# Patient Record
Sex: FEMALE | Race: WHITE | URBAN - METROPOLITAN AREA
[De-identification: names, ages, dates, MRNs, and addresses within clinical notes are randomized per-mention and may not be internally consistent; named-entity substitution may affect disease eponyms.]

---

## 2017-05-22 ENCOUNTER — THERAPY VISIT (OUTPATIENT)
Dept: PHYSICAL THERAPY | Facility: CLINIC | Age: 72
End: 2017-05-22
Payer: COMMERCIAL

## 2017-05-22 DIAGNOSIS — S76.311D RIGHT PROXIMAL HAMSTRING TENDON RUPTURE, SUBSEQUENT ENCOUNTER: Primary | ICD-10-CM

## 2017-05-22 PROCEDURE — 97161 PT EVAL LOW COMPLEX 20 MIN: CPT | Mod: GP | Performed by: PHYSICAL THERAPIST

## 2017-05-22 PROCEDURE — 97110 THERAPEUTIC EXERCISES: CPT | Mod: GP | Performed by: PHYSICAL THERAPIST

## 2017-05-22 NOTE — MR AVS SNAPSHOT
After Visit Summary   5/22/2017    Marilynn De La Rosa    MRN: [id number]           Patient Information     Date Of Birth          1945        Visit Information        Provider Department      5/22/2017 5:20 PM Teo Gayle, PT [facility] Athletic [facility] Physical Therapy        Today's Diagnoses     Right proximal hamstring tendon rupture, subsequent encounter    -  1       Follow-ups after your visit        Your next 10 appointments already scheduled     Jun 01, 2017 11:20 AM CDT   LUBNA Extremity with Teo Gayle PT   [facility] ([facility]  )    [address] [place] [id number]   [phone]            Jun 06, 2017 12:00 PM CDT   LUBNA Extremity with Teo Gayle PT   [facility] ([facility]  )    [address] [place] [address]   [phone]            Jun 16, 2017  2:00 PM CDT   LUBNA Extremity with Teo Gayle PT   [facility] ([facility]  )    [address] [place] [address]   [phone]            Jun 20, 2017 10:10 AM CDT   LUBNA Extremity with Teo Gayle PT   [facility] ([facility]  )    [address] [place] [address]   [phone]              Who to contact     If you have questions or need follow up information about today's clinic visit or your schedule please contact [facility] ATHLETIC [facility] PHYSICAL THERAPY directly at [phone].  Normal or non-critical lab and imaging results will be communicated to you by MyChart, letter or phone within 4 business days after the clinic has received the results. If you do not hear from us within 7 days, please contact the clinic through MyChart or phone. If you have a critical or abnormal lab result, we  "will notify you by phone as soon as possible.  Submit refill requests through [employer] or call your pharmacy and they will forward the refill request to us. Please allow 3 business days for your refill to be completed.          Additional Information About Your Visit        [employer] Information     [employer] lets you send messages to your doctor, view your test results, renew your prescriptions, schedule appointments and more. To sign up, go to [email or web address] . Click on \"Log in\" on the left side of the screen, which will take you to the Welcome page. Then click on \"Sign up Now\" on the right side of the page.     You will be asked to enter the access code listed below, as well as some personal information. Please follow the directions to create your username and password.     Your access code is: [email or web address]  Expires: 2017  5:14 AM     Your access code will  in 90 days. If you need help or a new code, please call your [place] clinic or [phone].        Care EveryWhere ID     This is your Care EveryWhere ID. This could be used by other organizations to access your [place] medical records  [id number]         Blood Pressure from Last 3 Encounters:   No data found for BP    Weight from Last 3 Encounters:   No data found for Wt              We Performed the Following     LUBNA Inital Eval Report     PT Eval, Low Complexity (50089)     Therapeutic Exercises        Primary Care Provider    None Specified       No primary provider on file.        Thank you!     Thank you for choosing [facility] PH[facility]ICAL THERAP[facility]  for your care. Our goal is always to provide you with excellent care. Hearing back from our patients is one way we can continue to improve our services. Please take a few minutes to complete the written survey that you may receive in the mail after your visit with us. Thank you!             Your Updated Medication List - Protect others around you: Learn how " to safely use, store and throw away your medicines at www.disposemymeds.org.      Notice  As of 5/22/2017 11:59 PM    You have not been prescribed any medications.

## 2017-05-22 NOTE — LETTER
[facility] ATHLETIC [facility] PHYSICAL [facility]  [address] [address]  [phone]    May 31, 2017    Re: Marilynn De La Rosa   :   1945  MRN:  [id number]   REFERRING PHYSICIAN:   Car Davis    [facility] ATHLETIC [facility] PHYSICAL [facility]    Date of Initial Evaluation:  17  Visits: 1 Rxs Used: 1  Reason for Referral:  Right proximal hamstring tendon rupture, subsequent encounter    EVALUATION SUMMARY    Subjective:  Patient is a 72 year old female presenting with rehab right hip hpi.   Marilynn De La Rosa is a 72 year old female with a right hip condition.  Condition occurred with:  A fall/slip.  Condition occurred: at home.  This is a new condition  17.  Patient injured her R hamstring at home when she slipped on a dog treat on her hardwood floor.  Her R leg went out forward and she landed on her L knee.  She felt a pop followed by severe pain over her R buttock and posterior thigh.  She was diagnosed with proximal R hamstring tear on MRI..    Patient reports pain:  Posterior.  Radiates to:  Knee.  Pain is described as other and aching (sore) and is intermittent and reported as 4/10.  Associated symptoms:  Loss of strength.   Symptoms are exacerbated by sitting, walking, descending stairs, ascending stairs and bending/squatting and relieved by NSAID's, analgesics and other (lying on stomach, wrapping R thigh with elastic wrap).  Since onset symptoms are gradually improving.  Special tests:  MRI.      General health as reported by patient is good.  Pertinent medical history includes:  High blood pressure, implanted device and sleep disorder/apnea.  Medical allergies: no.  Other surgeries include:  Orthopedic surgery (R TKA 16).  Current medications:  High blood pressure medication and pain medication.  Current occupation is Retired .      Barriers include:  Stairs.  Red flags:  Pain at rest/night (consistent with  current hamstring injury).    Objective:  Gait:    Gait Type:  Antalgic   Assistive Devices:  None  Deviations:  Ankle:  Push off decr RGeneral Deviations:  Stride length decr and mirian decr  Flexibility/Screens:   Lower Extremity:  Decreased right lower extremity flexibility:  Hamstrings      Re: Marilynn De La Rosa   :   1945    Hip Evaluation  Hip PROM:    Flexion: Left: WNL -   Right: WNL -  Internal Rotation: Left: WNL -    Right: WNL -  External Rotation: Left: WNL -    Right: WNL -  Hip Palpation:    Right hip tenderness present at:  Ischial Tuberosity  Knee Evaluation:  ROM:    AROM  Extension:  Left: WNL    Right:  WNL -  Flexion: Left: WNL -    Right: 122 -  Strength:   Quad Set Left:  WNL    Pain: -   Quad Set Right:  WNL    Pain: -  Palpation:    Left knee tenderness present at:  Patellar Lateral  Right knee tenderness present at:  Biceps Femoral and Semitendinosus  Edema:  Edema of the knee: Mild-moderate R knee edema.    Assessment/Plan:    Patient is a 72 year old female with right side hip and right side knee complaints.    Patient has the following significant findings with corresponding treatment plan.                Diagnosis 1:  Right proximal hamstring rupture  Pain -  hot/cold therapy, manual therapy, splint/taping/bracing/orthotics, self management, education and home program  Decreased ROM/flexibility - manual therapy, therapeutic exercise and home program  Decreased strength - therapeutic exercise, therapeutic activities and home program  Decreased proprioception - neuro re-education, gait training, therapeutic activities and home program  Edema - cold therapy and self management/home program  Impaired gait - gait training and home program  Impaired muscle performance - neuro re-education and home program  Decreased function - therapeutic activities and home program    Therapy Evaluation Codes:   1) History comprised of:   Personal factors that impact the plan of care:      Age.     Comorbidity factors that impact the plan of care are:      High blood pressure and Implanted device.     Medications impacting care: High blood pressure and Pain.  2) Examination of Body Systems comprised of:   Body structures and functions that impact the plan of care:      Hip.   Activity limitations that impact the plan of care are:      Bathing, Bending, Lifting, Sitting, Squatting/kneeling and Walking.    Re: Marilynn De La Rosa   :   1945    3) Clinical presentation characteristics are:   Stable/Uncomplicated.  4) Decision-Making    Low complexity using standardized patient assessment instrument and/or measureable assessment of functional outcome.  Cumulative Therapy Evaluation is: Low complexity.  Previous and current functional limitations:  (See Goal Flow Sheet for this information)    Short term and Long term goals: (See Goal Flow Sheet for this information)   Communication ability:  Patient appears to be able to clearly communicate and understand verbal and written communication and follow directions correctly.  Treatment Explanation - The following has been discussed with the patient:   RX ordered/plan of care  Anticipated outcomes  Possible risks and side effects  This patient would benefit from PT intervention to resume normal activities.   Rehab potential is good.  Frequency:  1 X week, once daily  Duration:  for 12 weeks  Discharge Plan:  Achieve all LTG.  Independent in home treatment program.  Reach maximal therapeutic benefit.    Thank you for your referral.    INQUIRIES  Therapist: Teo Gayle DPT, [facility]  [facility]  [address] [place] [address]  Phone: [phone]  Fax: [phone]

## 2017-05-30 PROBLEM — S76.311D: Status: ACTIVE | Noted: 2017-05-30

## 2017-06-01 ENCOUNTER — THERAPY VISIT (OUTPATIENT)
Dept: PHYSICAL THERAPY | Facility: CLINIC | Age: 72
End: 2017-06-01
Payer: COMMERCIAL

## 2017-06-01 DIAGNOSIS — S76.311D RIGHT PROXIMAL HAMSTRING TENDON RUPTURE, SUBSEQUENT ENCOUNTER: ICD-10-CM

## 2017-06-01 PROCEDURE — 97110 THERAPEUTIC EXERCISES: CPT | Mod: GP | Performed by: PHYSICAL THERAPIST

## 2017-06-01 PROCEDURE — 97112 NEUROMUSCULAR REEDUCATION: CPT | Mod: GP | Performed by: PHYSICAL THERAPIST

## 2017-06-06 ENCOUNTER — THERAPY VISIT (OUTPATIENT)
Dept: PHYSICAL THERAPY | Facility: CLINIC | Age: 72
End: 2017-06-06
Payer: COMMERCIAL

## 2017-06-06 DIAGNOSIS — S76.311D RIGHT PROXIMAL HAMSTRING TENDON RUPTURE, SUBSEQUENT ENCOUNTER: ICD-10-CM

## 2017-06-06 PROCEDURE — 97112 NEUROMUSCULAR REEDUCATION: CPT | Mod: GP | Performed by: PHYSICAL THERAPIST

## 2017-06-06 PROCEDURE — 97110 THERAPEUTIC EXERCISES: CPT | Mod: GP | Performed by: PHYSICAL THERAPIST

## 2017-06-06 PROCEDURE — 97140 MANUAL THERAPY 1/> REGIONS: CPT | Mod: GP | Performed by: PHYSICAL THERAPIST

## 2017-06-16 ENCOUNTER — THERAPY VISIT (OUTPATIENT)
Dept: PHYSICAL THERAPY | Facility: CLINIC | Age: 72
End: 2017-06-16
Payer: COMMERCIAL

## 2017-06-16 DIAGNOSIS — S76.311D RIGHT PROXIMAL HAMSTRING TENDON RUPTURE, SUBSEQUENT ENCOUNTER: ICD-10-CM

## 2017-06-16 PROCEDURE — 97112 NEUROMUSCULAR REEDUCATION: CPT | Mod: GP | Performed by: PHYSICAL THERAPIST

## 2017-06-16 PROCEDURE — 97140 MANUAL THERAPY 1/> REGIONS: CPT | Mod: GP | Performed by: PHYSICAL THERAPIST

## 2017-06-16 PROCEDURE — 97110 THERAPEUTIC EXERCISES: CPT | Mod: GP | Performed by: PHYSICAL THERAPIST

## 2017-06-20 ENCOUNTER — THERAPY VISIT (OUTPATIENT)
Dept: PHYSICAL THERAPY | Facility: CLINIC | Age: 72
End: 2017-06-20
Payer: COMMERCIAL

## 2017-06-20 DIAGNOSIS — S76.311D RIGHT PROXIMAL HAMSTRING TENDON RUPTURE, SUBSEQUENT ENCOUNTER: ICD-10-CM

## 2017-06-20 PROCEDURE — 97140 MANUAL THERAPY 1/> REGIONS: CPT | Mod: GP | Performed by: PHYSICAL THERAPIST

## 2017-06-20 PROCEDURE — 97110 THERAPEUTIC EXERCISES: CPT | Mod: GP | Performed by: PHYSICAL THERAPIST

## 2017-06-20 PROCEDURE — 97112 NEUROMUSCULAR REEDUCATION: CPT | Mod: GP | Performed by: PHYSICAL THERAPIST

## 2017-07-11 ENCOUNTER — THERAPY VISIT (OUTPATIENT)
Dept: PHYSICAL THERAPY | Facility: CLINIC | Age: 72
End: 2017-07-11
Payer: COMMERCIAL

## 2017-07-11 DIAGNOSIS — S76.311D RIGHT PROXIMAL HAMSTRING TENDON RUPTURE, SUBSEQUENT ENCOUNTER: ICD-10-CM

## 2017-07-11 PROCEDURE — 97110 THERAPEUTIC EXERCISES: CPT | Mod: GP | Performed by: PHYSICAL THERAPIST

## 2017-07-11 PROCEDURE — 97112 NEUROMUSCULAR REEDUCATION: CPT | Mod: GP | Performed by: PHYSICAL THERAPIST

## 2017-07-19 ENCOUNTER — THERAPY VISIT (OUTPATIENT)
Dept: PHYSICAL THERAPY | Facility: CLINIC | Age: 72
End: 2017-07-19
Payer: COMMERCIAL

## 2017-07-19 DIAGNOSIS — S76.311D RIGHT PROXIMAL HAMSTRING TENDON RUPTURE, SUBSEQUENT ENCOUNTER: ICD-10-CM

## 2017-07-19 PROCEDURE — 97112 NEUROMUSCULAR REEDUCATION: CPT | Mod: GP | Performed by: PHYSICAL THERAPIST

## 2017-07-19 PROCEDURE — 97110 THERAPEUTIC EXERCISES: CPT | Mod: GP | Performed by: PHYSICAL THERAPIST

## 2017-08-24 ENCOUNTER — THERAPY VISIT (OUTPATIENT)
Dept: PHYSICAL THERAPY | Facility: CLINIC | Age: 72
End: 2017-08-24
Payer: COMMERCIAL

## 2017-08-24 DIAGNOSIS — S76.311D RIGHT PROXIMAL HAMSTRING TENDON RUPTURE, SUBSEQUENT ENCOUNTER: ICD-10-CM

## 2017-08-24 PROCEDURE — 97110 THERAPEUTIC EXERCISES: CPT | Mod: GP | Performed by: PHYSICAL THERAPIST

## 2017-08-24 PROCEDURE — 97112 NEUROMUSCULAR REEDUCATION: CPT | Mod: GP | Performed by: PHYSICAL THERAPIST

## 2017-08-24 NOTE — PROGRESS NOTES
Subjective:    HPI                    Objective:    System                                                Knee Evaluation:  ROM:    AROM      Extension:  Left: WNL -    Right:  WFL -          Strength:       Flexion:  Left: 5/5    Pain:-      Right: 4/5    Pain:-    Quad Set Left:  WNL    Pain: -   Quad Set Right:  Fair    Pain: -                  General     ROS    Assessment/Plan:      DISCHARGE REPORT    Progress reporting period is from 5/22/17 to 8/24/17.       SUBJECTIVE  Subjective changes noted by patient:  Patient reports her R hamstring and posterior thigh feel much better. Denies c/o R hamstring pain. Patient continues to have R knee pain which is consistent since her R TKA surgery. She has L knee pain after going for walks.       Current Pain level: 0/10.     Initial Pain level: 3/10.   Changes in function:  Yes (See Goal flowsheet attached for changes in current functional level)  Adverse reaction to treatment or activity: None    OBJECTIVE  Changes noted in objective findings:  Yes, see objective findings.    ASSESSMENT/PLAN  Updated problem list and treatment plan: Diagnosis 1:  Right proximal hamstring rupture  Pain -  hot/cold therapy, manual therapy, self management, education and home program  Decreased ROM/flexibility - manual therapy, therapeutic exercise and home program  Decreased strength - therapeutic exercise, therapeutic activities and home program  Decreased proprioception - neuro re-education, gait training, therapeutic activities and home program  Impaired gait - gait training and home program  Impaired muscle performance - neuro re-education and home program  Decreased function - therapeutic activities and home program  STG/LTGs have been met or progress has been made towards goals:  Yes (See Goal flow sheet completed today.)  Assessment of Progress: The patient's condition is improving.  Self Management Plans:  Patient is independent in a home treatment program.  Patient is  independent in self management of symptoms.  I have re-evaluated this patient and find that the nature, scope, duration and intensity of the therapy is appropriate for the medical condition of the patient.  Marilynn continues to require the following intervention to meet STG and LTG's:  PT intervention is no longer required to meet STG/LTG.    Recommendations:  This patient is ready to be discharged from therapy and continue their home treatment program.    Please refer to the daily flowsheet for treatment today, total treatment time and time spent performing 1:1 timed codes.

## 2017-08-24 NOTE — LETTER
[facility] ATHLETIC [facility] PHYSICAL [facility]  [address] [address]  [phone]    2017    Re: Marilynn De La Rosa   :   1945  MRN:  [id number]   REFERRING PHYSICIAN:   Car Davis    [facility] ATHLETIC [facility]    Date of Initial Evaluation:  2017  Visits:  8  Rxs Used: 8  Reason for Referral:  Right proximal hamstring tendon rupture, subsequent encounter    DISCHARGE REPORT    Progress reporting period is from 17 to 17.       SUBJECTIVE  Subjective changes noted by patient:  Patient reports her R hamstring and posterior thigh feel much better. Denies c/o R hamstring pain. Patient continues to have R knee pain which is consistent since her R TKA surgery. She has L knee pain after going for walks.       Current Pain level: 0/10.     Initial Pain level: 3/10.   Changes in function:  Yes (See Goal flowsheet attached for changes in current functional level)  Adverse reaction to treatment or activity: None    OBJECTIVE  Changes noted in objective findings:  Yes, see objective findings.    ASSESSMENT/PLAN  Updated problem list and treatment plan: Diagnosis 1:  Right proximal hamstring rupture  Pain -  hot/cold therapy, manual therapy, self management, education and home program  Decreased ROM/flexibility - manual therapy, therapeutic exercise and home program  Decreased strength - therapeutic exercise, therapeutic activities and home program  Decreased proprioception - neuro re-education, gait training, therapeutic activities and home program  Impaired gait - gait training and home program  Impaired muscle performance - neuro re-education and home program  Decreased function - therapeutic activities and home program  STG/LTGs have been met or progress has been made towards goals:  Yes (See Goal flow sheet completed today.)  Assessment of Progress: The patient's condition is improving.  Self Management  Plans:  Patient is independent in a home treatment program.  Patient is independent in self management of symptoms.    Re: Marilynn De La Rosa   :   1945    I have re-evaluated this patient and find that the nature, scope, duration and intensity of the therapy is appropriate for the medical condition of the patient.  Marilynn continues to require the following intervention to meet STG and LTG's:  PT intervention is no longer required to meet STG/LTG.    Recommendations:  This patient is ready to be discharged from therapy and continue their home treatment program.              Thank you for your referral.    INQUIRIES  Therapist: Teo Gayle, PT  [facility] [place] PH[facility]ICAL THERAPY  [address] [place] [address]  Phone: [phone]  Fax: [phone]

## 2017-08-24 NOTE — MR AVS SNAPSHOT
"              After Visit Summary   2017    Marilynn De La Rosa    MRN: [id number]           Patient Information     Date Of Birth          1945        Visit Information        Provider Department      2017 10:10 AM Teo Gayle PT [facility] Physical [facility]        Today's Diagnoses     Right proximal hamstring tendon rupture, subsequent encounter           Follow-ups after your visit        Who to contact     If you have questions or need follow up information about today's clinic visit or your schedule please contact [facility] ATHLETIC [facility] PHYSICAL [facility] directly at [phone].  Normal or non-critical lab and imaging results will be communicated to you by [employer], letter or phone within 4 business days after the clinic has received the results. If you do not hear from us within 7 days, please contact the clinic through [employer] or phone. If you have a critical or abnormal lab result, we will notify you by phone as soon as possible.  Submit refill requests through [employer] or call your pharmacy and they will forward the refill request to us. Please allow 3 business days for your refill to be completed.          Additional Information About Your Visit        MyChart Information     [employer] lets you send messages to your doctor, view your test results, renew your prescriptions, schedule appointments and more. To sign up, go to [email or web address] . Click on \"Log in\" on the left side of the screen, which will take you to the Welcome page. Then click on \"Sign up Now\" on the right side of the page.     You will be asked to enter the access code listed below, as well as some personal information. Please follow the directions to create your username and password.     Your access code is: [email or web address]  Expires: 2017  5:44 AM     Your access code will  in 90 days. If you need help or a new code, please call your [place] " clinic or [phone].        Care EveryWhere ID     This is your Care EveryWhere ID. This could be used by other organizations to access your [place] medical records  [id number]         Blood Pressure from Last 3 Encounters:   No data found for BP    Weight from Last 3 Encounters:   No data found for Wt              We Performed the Following     LUBNA Progress Notes Report     Neuromuscular Re-Education     Therapeutic Exercises        Primary Care Provider    None Specified       No primary provider on file.        Equal Access to Services     [facility] : Hadii aad ku hadasho Soomaali, waaxda luqadaha, qaybta kaalmada adeegyada, waxay angelin lilian vinnie barnhartanthonyjaneen hanley . So [facility] [phone].    ATENCIÓN: Si habla eleazar, tiene a chacon disposición servicios gratuitos de asistencia lingüística. Llame al [phone].    We comply with applicable federal civil rights laws and [place] laws. We do not discriminate on the basis of race, color, national origin, age, disability sex, sexual orientation or gender identity.            Thank you!     Thank you for choosing [facility] PHYSICAL THERAPY  for your care. Our goal is always to provide you with excellent care. Hearing back from our patients is one way we can continue to improve our services. Please take a few minutes to complete the written survey that you may receive in the mail after your visit with us. Thank you!             Your Updated Medication List - Protect others around you: Learn how to safely use, store and throw away your medicines at www.disposemymeds.org.      Notice  As of 8/24/2017 11:59 PM    You have not been prescribed any medications.

## 2017-08-28 PROBLEM — S76.311D: Status: RESOLVED | Noted: 2017-05-30 | Resolved: 2017-08-28

## 2021-05-26 ENCOUNTER — RECORDS - HEALTHEAST (OUTPATIENT)
Dept: ADMINISTRATIVE | Facility: CLINIC | Age: 76
End: 2021-05-26

## 2024-04-08 ENCOUNTER — MEDICAL CORRESPONDENCE (OUTPATIENT)
Dept: HEALTH INFORMATION MANAGEMENT | Facility: CLINIC | Age: 79
End: 2024-04-08
Payer: MEDICARE

## 2024-04-08 ENCOUNTER — TELEPHONE (OUTPATIENT)
Dept: GASTROENTEROLOGY | Facility: CLINIC | Age: 79
End: 2024-04-08
Payer: MEDICARE

## 2024-04-08 ENCOUNTER — HOSPITAL ENCOUNTER (OUTPATIENT)
Dept: GENERAL RADIOLOGY | Facility: CLINIC | Age: 79
Discharge: HOME | End: 2024-04-08
Attending: INTERNAL MEDICINE
Payer: MEDICARE

## 2024-04-08 DIAGNOSIS — R93.3 ABNORMAL FINDING ON GI TRACT IMAGING: Primary | ICD-10-CM

## 2024-04-08 DIAGNOSIS — R93.3 ABNORMAL FINDING ON GI TRACT IMAGING: ICD-10-CM

## 2024-04-08 PROCEDURE — 999N000122 CT MHEALTH OVERREAD

## 2024-04-08 PROCEDURE — 74177 CT ABD & PELVIS W/CONTRAST: CPT | Mod: 26 | Performed by: RADIOLOGY

## 2024-04-08 NOTE — TELEPHONE ENCOUNTER
Per Dr. Mendoza  CT over-read here asking to specifically look at what might be causing the focal intrahepatic dilation.

## 2024-04-08 NOTE — TELEPHONE ENCOUNTER
Advanced Endoscopy     Referring provider: Dr Rowland    Referred to: [facility]     Provider Requested: na     Referral Received: 04/08/24    Records received: CareEverywhere     Images received: pacs    Insurance Coverage:tbd    Evaluation for: asking us to look at images in PACS , they don't think they can do an ERCP to drain, wondering if we are willing to try     Clinical History (per RN review):     Patient admitted, GI note 4-6-24  Romel Rowland MD - 04/07/2024 5:57 PM CDT  Formatting of this note might be different from the original.  Admitted again with bacteremia.  CBD decompressed stent in place.    Ongoing liver mets and segments 2 & 3 biliary dilation.    Unlikely I will be able to drain via ERCP, we will have CT/MRI images pushed to the U and will see if they are willing to consider ERCP.    Alternatively, IR drain or chronic suppressive Abx.    Please forward images to the U and I will reach out to a provider.    Romel Rowland MD 4/7/2024, 6:00 PM   Lina Lei MD - 04/06/2024 5:43 PM CDT  Formatting of this note might be different from the original.  Brief GI progress note:    Chart reviewed and discussed with advanced endoscopy staff (Dr. Schultz) and hospital medicine team. 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    CT abdomen/pelvis on admission noted plastic stent in the common bile duct with now decompressed CBD (previously dilated to 1.3cm prior to  placement of CBD stent on 3/17/24), reiterated cholelithiasis with a now decompressed gallbladder. There is persistently dilated bile ducts in the lateral aspect of the left lobe of the liver. LFTs with normal bili and alk phos but ALT and ast midly elevated (ALT 77, AST 78). Primary team evaluating for other sources of infection. Regarding consideration that dilated intrahepatic ducts could be an ongoing source of infection with inadequate drainage, we reviewed with advanced endoscopy if this area would be amenable to stenting with ERCP. Unfortunately, due to location this is not felt to be feasible. Should there be no other source of infection identified and patient does not respond appropriately to antibiotics would discuss with IR to see if a percutaneous transhepatic biliary drain would be considered. Agree with ID consultation.    Lina Lei MD  Staff Physician  [facility]     CT A/p 4/5/24    IMPRESSION:   1.  Interval placement of plastic CBD stent with decompression of central bile ducts and gallbladder.   2.  There remains localized intrahepatic bile duct dilatation involving lateral segment left lobe of liver no discrete obstructing lesion is not visible.   3.  Small hepatic metastases stable or slightly less pronounced the relate to some change in timing of contrast administration   4.  decreased size of the small mass between the left side of vaginal cuff and sigmoid colon.   5.  No new abnormality evident.     MRCP 3/16/24    IMPRESSION:   1. Biliary dilation secondary to a 0.6 cm obstructing stone in the distal common bile duct at the ampulla.     2.  Distended gallbladder with gallstones and localized pericholecystic fluid, which could represent acute cholecystitis.     3.  Grossly unchanged hepatic metastases, allowing for limitations due to noncontrast technique.     4.  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     5.  Tiny subcentimeter  cystic lesions in the pancreas. This can be followed on restaging CT or with dedicated follow-up, as per below:     ERCP 3/17/24  Impression:            - Normal esophagus.                          - Normal stomach.                          - Normal examined duodenum.                          - The major papilla was on the rim of a                          diverticulum.                          - Prior biliary sphincterotomy appeared stenosed or                          narrowed.                          - The examination was suspicious for biliary polyps.                          - The upper third of the main bile duct and middle                          third of the main bile duct were dilated.                          - The biliary tree was swept.                          - Biopsy was performed polypoid tissue at the                          papilla. This looks similar to prior images from                          1/2021 that was biopsied and brushed and were                          negative.                          - One temporary plastic biliary stent was placed                          into the common bile duct.   Recommendation:        - Watch for pancreatitis, bleeding, perforation,                          and cholangitis.                          - Observe patient's clinical course.                          - Avoid nonsteroidal anti-inflammatory medicines                          for 1 week.                          - OK to resume Eliquis tomorrow night                          - Await path results.                          - We will discuss findings with Dr. Lin.                          - The findings and recommendations were discussed                          with the patient's family.                          - Stent removal, exchange for another plastic or a                          metal stent will be determined based on biopsy                          results and overall plan for care and chemo  etc.     MD review date:   MD Decision for clinic consultation/Orders:            Referral updates/Patient contacted:

## 2024-04-09 ENCOUNTER — TRANSCRIBE ORDERS (OUTPATIENT)
Dept: OTHER | Age: 79
End: 2024-04-09

## 2024-04-09 DIAGNOSIS — K83.1 BILIARY OBSTRUCTION (H): Primary | ICD-10-CM

## 2024-04-12 NOTE — TELEPHONE ENCOUNTER
[facility] reached out- daughter is anxious for call back related to POC. States patient is getting IV antibiotics via picc line, has enough antibiotic to get through Monday 4/15/24 and has a visit scheduled with Infectious disease 4/15. They want to know if we want them to continue antibiotics.    Explained that we are waiting on overread of imaging (called film room 4/11/24 about it) Stated we will assess need for procedure based on imaging overread, but cannot/will not comment on plans for antibiotics, pt must continue to follow with established providers as we haven't seen her yet. [facility] will call daughter back with an update.  ML

## 2024-04-15 ENCOUNTER — HOSPITAL ENCOUNTER (OUTPATIENT)
Facility: CLINIC | Age: 79
End: 2024-04-15
Attending: INTERNAL MEDICINE | Admitting: INTERNAL MEDICINE
Payer: MEDICARE

## 2024-04-15 ENCOUNTER — PREP FOR PROCEDURE (OUTPATIENT)
Dept: GASTROENTEROLOGY | Facility: CLINIC | Age: 79
End: 2024-04-15
Payer: MEDICARE

## 2024-04-15 DIAGNOSIS — K83.1 BILIARY STRICTURE (H): Primary | ICD-10-CM

## 2024-04-15 NOTE — TELEPHONE ENCOUNTER
Per Dr. Mendoza after imaging overread:    we will organize a repeat ERCP with the goal of accessing these ducts for stent placement.     Called Daughter-Ting  [phone]    Patient follows with infectious disease tomorrow, 24, reviewed with Ting that ID will determine POC related to PICC line and antibiotics.     Please assist in scheduling:     Procedure/Imaging/Clinic: ERCP  Physician: Kelly  Timin/28  Scope time needed:provider average  Anesthesia:General  Dx: biliary stricture  Tier:Tier 3 -   Surgeries/procedures that can be delayed  between 30 to 90 days with no significant  morbidity/mortality to patient or impact on  patient/disease outcome.   Location: [facility]  Header of letter for pt communication: Endoscopic Retrograde Cholangiopancreatography (ERCP)        Called to discuss with patient.     Explained they can expect a call from  for date of procedure, OR will call 1-2 days prior to procedure date with arrival time, will need a , someone to stay with them for 24 hours and should stay in town for 24 hours (within 45 min of Hospital) post procedure    Patient needs to get pre-op physical completed. If outside  health system will need physical faxed to number [phone]     If you do not get a preop physical, your procedure could be cancelled, patient voiced understanding*    Preop Plan:will see existing provider for PCP    Does patient have any history of gastric bypass/gastric surgery/altered panc/bili anatomy?no    Any recent Covid symptoms or positive covid test?no    Does patient have [employer] insurance?:no    Med Review    Blood thinner -  xerelto, will hold for 2 days  ASA - no  Diabetic - no  Any meds by injection or mouth for weight loss or diabetes-none    Patient Education r/t procedure:done    A pre-op nurse will call 1-2 days prior to the procedure.    NPO/Prep:   Adults and Children of all ages may consume solids up to 8 hours prior to arrival time - may consume  clear liquids up to 1 hour prior to arrival time.    Other specific details/comments:none     Verbalized understanding of all instructions. All questions answered.     Procedure order placed, message routed to OR / Endo

## 2024-04-16 ENCOUNTER — TELEPHONE (OUTPATIENT)
Dept: GASTROENTEROLOGY | Facility: CLINIC | Age: 79
End: 2024-04-16
Payer: MEDICARE

## 2024-04-16 NOTE — TELEPHONE ENCOUNTER
[facility] Call Center    Phone Message    May a detailed message be left on voicemail: yes     Reason for Call: Other: Bobbi calling to discuss biliary drain put in on 04/19 at [facility]. Please call Bobbi to discuss.      Action Taken: Message routed to:  [facility] RUEL VELASQUEZ    Travel Screening: Not Applicable

## 2024-04-18 NOTE — TELEPHONE ENCOUNTER
Returned call to Ting. Pt scheduled for biliary drain placement tomorrow at [facility]. Explained will keep procedure with Dr. Mendoza on as scheduled for now and will reassess plan for ERCP after PTC placement tomorrow.    CARSON

## 2024-04-18 NOTE — TELEPHONE ENCOUNTER
Called in to Becca at [facility], explained patient is on Dr. Mendoza's cancellation list for earlier procedure than 5/28 (noting that she needs to hold Eliquis for 2 days prior to procedure)  Daughter knows to bring mom to [facility] ER for emergent needs. Becca understands plan for move up with cancellations.    ML

## 2024-04-23 ENCOUNTER — PATIENT OUTREACH (OUTPATIENT)
Dept: GASTROENTEROLOGY | Facility: CLINIC | Age: 79
End: 2024-04-23
Payer: MEDICARE

## 2024-04-23 NOTE — TELEPHONE ENCOUNTER
[facility] called, pt admitted at [facility]. Per referring MD Dr Earl, ok to cancel procedure as scheduled with Dr. Mendoza on 5/28/24. Message sent to scheduling    ML